# Patient Record
Sex: MALE | Race: WHITE
[De-identification: names, ages, dates, MRNs, and addresses within clinical notes are randomized per-mention and may not be internally consistent; named-entity substitution may affect disease eponyms.]

---

## 2018-10-09 LAB
ALBUMIN SERPL BCP-MCNC: 3.6 G/DL (ref 3.4–5)
ALBUMIN/GLOB SERPL: 1 {RATIO} (ref 1.1–1.5)
ALP SERPL-CCNC: 92 IU/L (ref 46–116)
ALT SERPL W P-5'-P-CCNC: 37 U/L (ref 30–65)
ANION GAP SERPL CALCULATED.3IONS-SCNC: 9 MMOL/L (ref 8–16)
AST SERPL W P-5'-P-CCNC: 27 U/L (ref 10–37)
BASOPHILS # BLD AUTO: 0 X10'3 (ref 0–0.2)
BASOPHILS NFR BLD AUTO: 0.5 % (ref 0–1)
BILIRUB SERPL-MCNC: 0.5 MG/DL (ref 0–1)
BUN SERPL-MCNC: 19 MG/DL (ref 7–18)
BUN/CREAT SERPL: 20.7 (ref 5.4–32)
CALCIUM SERPL-MCNC: 8.6 MG/DL (ref 8.5–10.1)
CHLORIDE SERPL-SCNC: 105 MMOL/L (ref 99–107)
CO2 SERPL-SCNC: 27.2 MMOL/L (ref 24–32)
CREAT SERPL-MCNC: 0.92 MG/DL (ref 0.6–1.1)
EOSINOPHIL # BLD AUTO: 0.1 X10'3 (ref 0–0.9)
EOSINOPHIL NFR BLD AUTO: 1.1 % (ref 0–6)
ERYTHROCYTE [DISTWIDTH] IN BLOOD BY AUTOMATED COUNT: 12.6 % (ref 11.5–14.5)
GFR SERPL CREATININE-BSD FRML MDRD: 82 ML/MIN
GLUCOSE SERPL-MCNC: 118 MG/DL (ref 70–104)
HBA1C MFR BLD: 6.3 % (ref 4.5–6.2)
HCT VFR BLD AUTO: 43.5 % (ref 42–52)
HGB BLD-MCNC: 14.6 G/DL (ref 14–17.9)
LYMPHOCYTES # BLD AUTO: 1.2 X10'3 (ref 1.1–4.8)
LYMPHOCYTES NFR BLD AUTO: 16.9 % (ref 21–51)
MCH RBC QN AUTO: 30.2 PG (ref 27–31)
MCHC RBC AUTO-ENTMCNC: 33.7 % (ref 33–36.5)
MCV RBC AUTO: 89.7 FL (ref 78–98)
MONOCYTES # BLD AUTO: 0.7 X10'3 (ref 0–0.9)
MONOCYTES NFR BLD AUTO: 9.1 % (ref 2–12)
NEUTROPHILS # BLD AUTO: 5.4 X10'3 (ref 1.8–7.7)
NEUTROPHILS NFR BLD AUTO: 72.4 % (ref 42–75)
PLATELET # BLD AUTO: 194 X10'3 (ref 140–440)
PMV BLD AUTO: 7.9 FL (ref 7.4–10.4)
POTASSIUM SERPL-SCNC: 4 MMOL/L (ref 3.4–5.1)
PROT SERPL-MCNC: 7.2 G/DL (ref 6.4–8.2)
RBC # BLD AUTO: 4.85 X10'6 (ref 4.7–6.1)
SODIUM SERPL-SCNC: 141 MMOL/L (ref 135–145)

## 2018-10-17 ENCOUNTER — HOSPITAL ENCOUNTER (INPATIENT)
Dept: HOSPITAL 94 - PAS IN | Age: 68
LOS: 3 days | Discharge: HOME | DRG: 470 | End: 2018-10-20
Attending: ORTHOPAEDIC SURGERY | Admitting: ORTHOPAEDIC SURGERY
Payer: COMMERCIAL

## 2018-10-17 VITALS — SYSTOLIC BLOOD PRESSURE: 121 MMHG | DIASTOLIC BLOOD PRESSURE: 64 MMHG

## 2018-10-17 VITALS — DIASTOLIC BLOOD PRESSURE: 59 MMHG | SYSTOLIC BLOOD PRESSURE: 110 MMHG

## 2018-10-17 VITALS — SYSTOLIC BLOOD PRESSURE: 130 MMHG | DIASTOLIC BLOOD PRESSURE: 87 MMHG

## 2018-10-17 VITALS — DIASTOLIC BLOOD PRESSURE: 64 MMHG | SYSTOLIC BLOOD PRESSURE: 113 MMHG

## 2018-10-17 VITALS — DIASTOLIC BLOOD PRESSURE: 46 MMHG | SYSTOLIC BLOOD PRESSURE: 110 MMHG

## 2018-10-17 VITALS — SYSTOLIC BLOOD PRESSURE: 122 MMHG | DIASTOLIC BLOOD PRESSURE: 70 MMHG

## 2018-10-17 VITALS — SYSTOLIC BLOOD PRESSURE: 97 MMHG | DIASTOLIC BLOOD PRESSURE: 58 MMHG

## 2018-10-17 VITALS — DIASTOLIC BLOOD PRESSURE: 68 MMHG | SYSTOLIC BLOOD PRESSURE: 127 MMHG

## 2018-10-17 VITALS — SYSTOLIC BLOOD PRESSURE: 118 MMHG | DIASTOLIC BLOOD PRESSURE: 77 MMHG

## 2018-10-17 VITALS — DIASTOLIC BLOOD PRESSURE: 43 MMHG | SYSTOLIC BLOOD PRESSURE: 127 MMHG

## 2018-10-17 VITALS — HEIGHT: 70 IN | BODY MASS INDEX: 33.49 KG/M2 | WEIGHT: 233.91 LBS

## 2018-10-17 VITALS — DIASTOLIC BLOOD PRESSURE: 67 MMHG | SYSTOLIC BLOOD PRESSURE: 106 MMHG

## 2018-10-17 VITALS — DIASTOLIC BLOOD PRESSURE: 51 MMHG | SYSTOLIC BLOOD PRESSURE: 113 MMHG

## 2018-10-17 VITALS — SYSTOLIC BLOOD PRESSURE: 126 MMHG | DIASTOLIC BLOOD PRESSURE: 72 MMHG

## 2018-10-17 VITALS — DIASTOLIC BLOOD PRESSURE: 46 MMHG | SYSTOLIC BLOOD PRESSURE: 103 MMHG

## 2018-10-17 VITALS — DIASTOLIC BLOOD PRESSURE: 87 MMHG | SYSTOLIC BLOOD PRESSURE: 130 MMHG

## 2018-10-17 VITALS — SYSTOLIC BLOOD PRESSURE: 112 MMHG | DIASTOLIC BLOOD PRESSURE: 61 MMHG

## 2018-10-17 VITALS — DIASTOLIC BLOOD PRESSURE: 60 MMHG | SYSTOLIC BLOOD PRESSURE: 104 MMHG

## 2018-10-17 VITALS — DIASTOLIC BLOOD PRESSURE: 64 MMHG | SYSTOLIC BLOOD PRESSURE: 115 MMHG

## 2018-10-17 DIAGNOSIS — E11.9: ICD-10-CM

## 2018-10-17 DIAGNOSIS — I10: ICD-10-CM

## 2018-10-17 DIAGNOSIS — K21.9: ICD-10-CM

## 2018-10-17 DIAGNOSIS — N40.0: ICD-10-CM

## 2018-10-17 DIAGNOSIS — D62: ICD-10-CM

## 2018-10-17 DIAGNOSIS — G47.30: ICD-10-CM

## 2018-10-17 DIAGNOSIS — E78.5: ICD-10-CM

## 2018-10-17 DIAGNOSIS — I25.2: ICD-10-CM

## 2018-10-17 DIAGNOSIS — M17.11: Primary | ICD-10-CM

## 2018-10-17 DIAGNOSIS — Z88.8: ICD-10-CM

## 2018-10-17 LAB
ANION GAP BLD CALC-SCNC: 14 MMOL/L (ref 8–12)
BUN BLD-MCNC: 13 MG/DL (ref 6–19)
BUN/CREAT BLD: 18.6 (ref 5.4–32)
CA-I BLD-SCNC: 1.13 MMOL/L (ref 1.03–1.32)
CHLORIDE BLD-SCNC: 100 MMOL/L (ref 99–107)
CO2 BLD-SCNC: 25 MMOL/L (ref 24–32)
CREAT BLD-MCNC: 0.7 MG/DL (ref 0.8–1.3)
GFR SERPL CREATININE-BSD FRML MDRD: > 90 ML/MIN
GLUCOSE SERPLBLD-MCNC: 128 MG/DL (ref 70–104)
HCT VFR BLD CALC: 35 %PCV (ref 42–52)
HGB BLD CALC-MCNC: 11.9 G/DL (ref 14–18)
POTASSIUM BLD-SCNC: 3.7 MMOL/L (ref 3.5–5.1)
SODIUM BLD-SCNC: 139 MMOL/L (ref 135–145)

## 2018-10-17 PROCEDURE — 82948 REAGENT STRIP/BLOOD GLUCOSE: CPT

## 2018-10-17 PROCEDURE — 83036 HEMOGLOBIN GLYCOSYLATED A1C: CPT

## 2018-10-17 PROCEDURE — 85025 COMPLETE CBC W/AUTO DIFF WBC: CPT

## 2018-10-17 PROCEDURE — 0SRC069 REPLACEMENT OF RIGHT KNEE JOINT WITH OXIDIZED ZIRCONIUM ON POLYETHYLENE SYNTHETIC SUBSTITUTE, CEMENTED, OPEN APPROACH: ICD-10-PCS | Performed by: ORTHOPAEDIC SURGERY

## 2018-10-17 PROCEDURE — 8E0Y0CZ ROBOTIC ASSISTED PROCEDURE OF LOWER EXTREMITY, OPEN APPROACH: ICD-10-PCS | Performed by: ORTHOPAEDIC SURGERY

## 2018-10-17 PROCEDURE — 97161 PT EVAL LOW COMPLEX 20 MIN: CPT

## 2018-10-17 PROCEDURE — 80047 BASIC METABLC PNL IONIZED CA: CPT

## 2018-10-17 PROCEDURE — 87070 CULTURE OTHR SPECIMN AEROBIC: CPT

## 2018-10-17 PROCEDURE — 36415 COLL VENOUS BLD VENIPUNCTURE: CPT

## 2018-10-17 PROCEDURE — 97530 THERAPEUTIC ACTIVITIES: CPT

## 2018-10-17 PROCEDURE — 80051 ELECTROLYTE PANEL: CPT

## 2018-10-17 PROCEDURE — 97116 GAIT TRAINING THERAPY: CPT

## 2018-10-17 PROCEDURE — 80053 COMPREHEN METABOLIC PANEL: CPT

## 2018-10-17 PROCEDURE — 97110 THERAPEUTIC EXERCISES: CPT

## 2018-10-17 RX ADMIN — Medication SCH EACH: at 21:45

## 2018-10-18 VITALS — DIASTOLIC BLOOD PRESSURE: 46 MMHG | SYSTOLIC BLOOD PRESSURE: 137 MMHG

## 2018-10-18 VITALS — SYSTOLIC BLOOD PRESSURE: 115 MMHG | DIASTOLIC BLOOD PRESSURE: 57 MMHG

## 2018-10-18 VITALS — SYSTOLIC BLOOD PRESSURE: 120 MMHG | DIASTOLIC BLOOD PRESSURE: 58 MMHG

## 2018-10-18 VITALS — SYSTOLIC BLOOD PRESSURE: 111 MMHG | DIASTOLIC BLOOD PRESSURE: 69 MMHG

## 2018-10-18 VITALS — SYSTOLIC BLOOD PRESSURE: 93 MMHG | DIASTOLIC BLOOD PRESSURE: 58 MMHG

## 2018-10-18 VITALS — SYSTOLIC BLOOD PRESSURE: 105 MMHG | DIASTOLIC BLOOD PRESSURE: 52 MMHG

## 2018-10-18 LAB
ANION GAP SERPL CALCULATED.3IONS-SCNC: 7 MMOL/L (ref 8–16)
BASOPHILS # BLD AUTO: 0 X10'3 (ref 0–0.2)
BASOPHILS NFR BLD AUTO: 0.3 % (ref 0–1)
CHLORIDE SERPL-SCNC: 103 MMOL/L (ref 99–107)
CO2 SERPL-SCNC: 27.8 MMOL/L (ref 24–32)
EOSINOPHIL # BLD AUTO: 0.1 X10'3 (ref 0–0.9)
EOSINOPHIL NFR BLD AUTO: 1.6 % (ref 0–6)
ERYTHROCYTE [DISTWIDTH] IN BLOOD BY AUTOMATED COUNT: 13.5 % (ref 11.5–14.5)
HCT VFR BLD AUTO: 32.3 % (ref 42–52)
HGB BLD-MCNC: 10.8 G/DL (ref 14–17.9)
LYMPHOCYTES # BLD AUTO: 1.2 X10'3 (ref 1.1–4.8)
LYMPHOCYTES NFR BLD AUTO: 19.7 % (ref 21–51)
MCH RBC QN AUTO: 30.1 PG (ref 27–31)
MCHC RBC AUTO-ENTMCNC: 33.5 % (ref 33–36.5)
MCV RBC AUTO: 90.1 FL (ref 78–98)
MONOCYTES # BLD AUTO: 0.9 X10'3 (ref 0–0.9)
MONOCYTES NFR BLD AUTO: 14.9 % (ref 2–12)
NEUTROPHILS # BLD AUTO: 3.9 X10'3 (ref 1.8–7.7)
NEUTROPHILS NFR BLD AUTO: 63.5 % (ref 42–75)
PLATELET # BLD AUTO: 156 X10'3 (ref 140–440)
PMV BLD AUTO: 7.7 FL (ref 7.4–10.4)
POTASSIUM SERPL-SCNC: 3.6 MMOL/L (ref 3.5–5.1)
RBC # BLD AUTO: 3.59 X10'6 (ref 4.7–6.1)
SODIUM SERPL-SCNC: 138 MMOL/L (ref 135–145)
WBC # BLD AUTO: 6.2 X10'3 (ref 4.5–11)

## 2018-10-18 PROCEDURE — 5A09357 ASSISTANCE WITH RESPIRATORY VENTILATION, LESS THAN 24 CONSECUTIVE HOURS, CONTINUOUS POSITIVE AIRWAY PRESSURE: ICD-10-PCS | Performed by: ORTHOPAEDIC SURGERY

## 2018-10-18 RX ADMIN — Medication SCH MLS/HR: at 07:45

## 2018-10-18 RX ADMIN — OXYCODONE PRN MG: 5 TABLET ORAL at 07:45

## 2018-10-18 RX ADMIN — SODIUM CHLORIDE AND POTASSIUM CHLORIDE SCH MLS/HR: 4.5; 1.49 INJECTION, SOLUTION INTRAVENOUS at 17:08

## 2018-10-18 RX ADMIN — OXYCODONE PRN MG: 5 TABLET ORAL at 13:56

## 2018-10-18 RX ADMIN — OXYCODONE PRN MG: 5 TABLET ORAL at 17:44

## 2018-10-18 RX ADMIN — SODIUM CHLORIDE AND POTASSIUM CHLORIDE SCH MLS/HR: 4.5; 1.49 INJECTION, SOLUTION INTRAVENOUS at 11:39

## 2018-10-18 RX ADMIN — Medication SCH UNIT: at 07:45

## 2018-10-18 RX ADMIN — Medication SCH EACH: at 19:49

## 2018-10-18 RX ADMIN — OXYCODONE PRN MG: 5 TABLET ORAL at 21:19

## 2018-10-18 RX ADMIN — Medication SCH EACH: at 10:13

## 2018-10-18 RX ADMIN — SODIUM CHLORIDE AND POTASSIUM CHLORIDE SCH MLS/HR: 4.5; 1.49 INJECTION, SOLUTION INTRAVENOUS at 01:08

## 2018-10-18 RX ADMIN — METOPROLOL SUCCINATE SCH MG: 25 TABLET, EXTENDED RELEASE ORAL at 21:18

## 2018-10-18 RX ADMIN — Medication NR EACH: at 10:00

## 2018-10-18 RX ADMIN — SODIUM CHLORIDE AND POTASSIUM CHLORIDE SCH MLS/HR: 4.5; 1.49 INJECTION, SOLUTION INTRAVENOUS at 00:08

## 2018-10-18 RX ADMIN — ENOXAPARIN SODIUM SCH MG: 100 INJECTION SUBCUTANEOUS at 07:45

## 2018-10-18 RX ADMIN — Medication SCH MLS/HR: at 00:06

## 2018-10-18 RX ADMIN — METOPROLOL SUCCINATE SCH MG: 25 TABLET, EXTENDED RELEASE ORAL at 00:19

## 2018-10-18 RX ADMIN — PANTOPRAZOLE SODIUM SCH MG: 40 TABLET, DELAYED RELEASE ORAL at 07:45

## 2018-10-19 VITALS — SYSTOLIC BLOOD PRESSURE: 114 MMHG | DIASTOLIC BLOOD PRESSURE: 57 MMHG

## 2018-10-19 VITALS — DIASTOLIC BLOOD PRESSURE: 54 MMHG | SYSTOLIC BLOOD PRESSURE: 114 MMHG

## 2018-10-19 VITALS — DIASTOLIC BLOOD PRESSURE: 54 MMHG | SYSTOLIC BLOOD PRESSURE: 121 MMHG

## 2018-10-19 VITALS — DIASTOLIC BLOOD PRESSURE: 60 MMHG | SYSTOLIC BLOOD PRESSURE: 121 MMHG

## 2018-10-19 LAB
BASOPHILS # BLD AUTO: 0.1 X10'3 (ref 0–0.2)
BASOPHILS NFR BLD AUTO: 0.9 % (ref 0–1)
EOSINOPHIL # BLD AUTO: 0.1 X10'3 (ref 0–0.9)
EOSINOPHIL NFR BLD AUTO: 1.2 % (ref 0–6)
ERYTHROCYTE [DISTWIDTH] IN BLOOD BY AUTOMATED COUNT: 13.5 % (ref 11.5–14.5)
HCT VFR BLD AUTO: 34.5 % (ref 42–52)
HGB BLD-MCNC: 11.5 G/DL (ref 14–17.9)
LYMPHOCYTES # BLD AUTO: 1.3 X10'3 (ref 1.1–4.8)
LYMPHOCYTES NFR BLD AUTO: 15.1 % (ref 21–51)
MCH RBC QN AUTO: 30.2 PG (ref 27–31)
MCHC RBC AUTO-ENTMCNC: 33.2 % (ref 33–36.5)
MCV RBC AUTO: 90.9 FL (ref 78–98)
MONOCYTES # BLD AUTO: 1.1 X10'3 (ref 0–0.9)
MONOCYTES NFR BLD AUTO: 13.1 % (ref 2–12)
NEUTROPHILS # BLD AUTO: 5.8 X10'3 (ref 1.8–7.7)
NEUTROPHILS NFR BLD AUTO: 69.7 % (ref 42–75)
PLATELET # BLD AUTO: 181 X10'3 (ref 140–440)
PMV BLD AUTO: 7.7 FL (ref 7.4–10.4)
RBC # BLD AUTO: 3.8 X10'6 (ref 4.7–6.1)
WBC # BLD AUTO: 8.3 X10'3 (ref 4.5–11)

## 2018-10-19 PROCEDURE — 5A09357 ASSISTANCE WITH RESPIRATORY VENTILATION, LESS THAN 24 CONSECUTIVE HOURS, CONTINUOUS POSITIVE AIRWAY PRESSURE: ICD-10-PCS | Performed by: ORTHOPAEDIC SURGERY

## 2018-10-19 RX ADMIN — OXYCODONE PRN MG: 5 TABLET ORAL at 05:19

## 2018-10-19 RX ADMIN — Medication SCH UNIT: at 07:45

## 2018-10-19 RX ADMIN — Medication SCH EACH: at 20:23

## 2018-10-19 RX ADMIN — OXYCODONE PRN MG: 5 TABLET ORAL at 09:50

## 2018-10-19 RX ADMIN — Medication SCH EACH: at 07:43

## 2018-10-19 RX ADMIN — PANTOPRAZOLE SODIUM SCH MG: 40 TABLET, DELAYED RELEASE ORAL at 07:43

## 2018-10-19 RX ADMIN — ENOXAPARIN SODIUM SCH MG: 100 INJECTION SUBCUTANEOUS at 07:45

## 2018-10-19 RX ADMIN — Medication NR EACH: at 10:00

## 2018-10-19 RX ADMIN — METOPROLOL SUCCINATE SCH MG: 25 TABLET, EXTENDED RELEASE ORAL at 20:23

## 2018-10-19 RX ADMIN — OXYCODONE PRN MG: 5 TABLET ORAL at 14:15

## 2018-10-20 VITALS — DIASTOLIC BLOOD PRESSURE: 55 MMHG | SYSTOLIC BLOOD PRESSURE: 109 MMHG

## 2018-10-20 VITALS — SYSTOLIC BLOOD PRESSURE: 118 MMHG | DIASTOLIC BLOOD PRESSURE: 50 MMHG

## 2018-10-20 LAB
BASOPHILS # BLD AUTO: 0 X10'3 (ref 0–0.2)
BASOPHILS NFR BLD AUTO: 0.2 % (ref 0–1)
EOSINOPHIL # BLD AUTO: 0.2 X10'3 (ref 0–0.9)
EOSINOPHIL NFR BLD AUTO: 2.3 % (ref 0–6)
ERYTHROCYTE [DISTWIDTH] IN BLOOD BY AUTOMATED COUNT: 13.8 % (ref 11.5–14.5)
HCT VFR BLD AUTO: 32.8 % (ref 42–52)
HGB BLD-MCNC: 11 G/DL (ref 14–17.9)
LYMPHOCYTES # BLD AUTO: 1.5 X10'3 (ref 1.1–4.8)
LYMPHOCYTES NFR BLD AUTO: 19.2 % (ref 21–51)
MCH RBC QN AUTO: 30.1 PG (ref 27–31)
MCHC RBC AUTO-ENTMCNC: 33.5 % (ref 33–36.5)
MCV RBC AUTO: 89.9 FL (ref 78–98)
MONOCYTES # BLD AUTO: 1.1 X10'3 (ref 0–0.9)
MONOCYTES NFR BLD AUTO: 14.4 % (ref 2–12)
NEUTROPHILS # BLD AUTO: 4.9 X10'3 (ref 1.8–7.7)
NEUTROPHILS NFR BLD AUTO: 63.9 % (ref 42–75)
PLATELET # BLD AUTO: 183 X10'3 (ref 140–440)
PMV BLD AUTO: 8 FL (ref 7.4–10.4)
RBC # BLD AUTO: 3.65 X10'6 (ref 4.7–6.1)
WBC # BLD AUTO: 7.6 X10'3 (ref 4.5–11)

## 2018-10-20 RX ADMIN — Medication SCH EACH: at 08:20

## 2018-10-20 RX ADMIN — OXYCODONE PRN MG: 5 TABLET ORAL at 09:52

## 2018-10-20 RX ADMIN — PANTOPRAZOLE SODIUM SCH MG: 40 TABLET, DELAYED RELEASE ORAL at 08:20

## 2018-10-20 RX ADMIN — Medication NR EACH: at 10:32

## 2018-10-20 RX ADMIN — OXYCODONE PRN MG: 5 TABLET ORAL at 05:24

## 2018-10-20 RX ADMIN — ENOXAPARIN SODIUM SCH MG: 100 INJECTION SUBCUTANEOUS at 08:20

## 2018-10-20 RX ADMIN — Medication SCH UNIT: at 08:21

## 2019-08-13 LAB
ALBUMIN SERPL BCP-MCNC: 3.9 G/DL (ref 3.4–5)
ALBUMIN/GLOB SERPL: 1 {RATIO} (ref 1.1–1.5)
ALP SERPL-CCNC: 86 IU/L (ref 46–116)
ALT SERPL W P-5'-P-CCNC: 33 U/L (ref 30–65)
ANION GAP SERPL CALCULATED.3IONS-SCNC: 7 MMOL/L (ref 8–16)
AST SERPL W P-5'-P-CCNC: 26 U/L (ref 10–37)
BASOPHILS # BLD AUTO: 0 X10'3 (ref 0–0.2)
BASOPHILS NFR BLD AUTO: 0.5 % (ref 0–1)
BILIRUB SERPL-MCNC: 0.7 MG/DL (ref 0–1)
BUN SERPL-MCNC: 23 MG/DL (ref 7–18)
BUN/CREAT SERPL: 22.3 (ref 5.4–32)
CALCIUM SERPL-MCNC: 9.1 MG/DL (ref 8.5–10.1)
CHLORIDE SERPL-SCNC: 105 MMOL/L (ref 99–107)
CO2 SERPL-SCNC: 27.8 MMOL/L (ref 24–32)
CREAT SERPL-MCNC: 1.03 MG/DL (ref 0.6–1.1)
EOSINOPHIL # BLD AUTO: 0.1 X10'3 (ref 0–0.9)
EOSINOPHIL NFR BLD AUTO: 1.5 % (ref 0–6)
ERYTHROCYTE [DISTWIDTH] IN BLOOD BY AUTOMATED COUNT: 14.4 % (ref 11.5–14.5)
GFR SERPL CREATININE-BSD FRML MDRD: 72 ML/MIN
GLUCOSE SERPL-MCNC: 102 MG/DL (ref 70–104)
HBA1C MFR BLD: 6 % (ref 4.5–6.2)
HCT VFR BLD AUTO: 44.5 % (ref 42–52)
HGB BLD-MCNC: 15.1 G/DL (ref 14–17.9)
LYMPHOCYTES # BLD AUTO: 2.2 X10'3 (ref 1.1–4.8)
LYMPHOCYTES NFR BLD AUTO: 29.8 % (ref 21–51)
MCH RBC QN AUTO: 30.2 PG (ref 27–31)
MCHC RBC AUTO-ENTMCNC: 33.9 G/DL (ref 33–36.5)
MCV RBC AUTO: 89.2 FL (ref 78–98)
MONOCYTES # BLD AUTO: 1 X10'3 (ref 0–0.9)
MONOCYTES NFR BLD AUTO: 13.3 % (ref 2–12)
NEUTROPHILS # BLD AUTO: 4 X10'3 (ref 1.8–7.7)
NEUTROPHILS NFR BLD AUTO: 54.9 % (ref 42–75)
PLATELET # BLD AUTO: 204 X10'3 (ref 140–440)
PMV BLD AUTO: 7.3 FL (ref 7.4–10.4)
POTASSIUM SERPL-SCNC: 4 MMOL/L (ref 3.4–5.1)
PROT SERPL-MCNC: 7.8 G/DL (ref 6.4–8.2)
RBC # BLD AUTO: 4.98 X10'6 (ref 4.7–6.1)
SODIUM SERPL-SCNC: 140 MMOL/L (ref 135–145)

## 2019-08-21 ENCOUNTER — HOSPITAL ENCOUNTER (INPATIENT)
Dept: HOSPITAL 94 - PAS IN | Age: 69
LOS: 1 days | Discharge: HOME | DRG: 470 | End: 2019-08-22
Attending: ORTHOPAEDIC SURGERY | Admitting: ORTHOPAEDIC SURGERY
Payer: MEDICARE

## 2019-08-21 VITALS — SYSTOLIC BLOOD PRESSURE: 114 MMHG | DIASTOLIC BLOOD PRESSURE: 57 MMHG

## 2019-08-21 VITALS — DIASTOLIC BLOOD PRESSURE: 58 MMHG | SYSTOLIC BLOOD PRESSURE: 125 MMHG

## 2019-08-21 VITALS — DIASTOLIC BLOOD PRESSURE: 56 MMHG | SYSTOLIC BLOOD PRESSURE: 114 MMHG

## 2019-08-21 VITALS — DIASTOLIC BLOOD PRESSURE: 68 MMHG | SYSTOLIC BLOOD PRESSURE: 115 MMHG

## 2019-08-21 VITALS — DIASTOLIC BLOOD PRESSURE: 58 MMHG | SYSTOLIC BLOOD PRESSURE: 108 MMHG

## 2019-08-21 VITALS — DIASTOLIC BLOOD PRESSURE: 52 MMHG | SYSTOLIC BLOOD PRESSURE: 101 MMHG

## 2019-08-21 VITALS — SYSTOLIC BLOOD PRESSURE: 113 MMHG | DIASTOLIC BLOOD PRESSURE: 53 MMHG

## 2019-08-21 VITALS — DIASTOLIC BLOOD PRESSURE: 52 MMHG | SYSTOLIC BLOOD PRESSURE: 110 MMHG

## 2019-08-21 VITALS — BODY MASS INDEX: 30.39 KG/M2 | HEIGHT: 73 IN | WEIGHT: 229.28 LBS

## 2019-08-21 VITALS — SYSTOLIC BLOOD PRESSURE: 114 MMHG | DIASTOLIC BLOOD PRESSURE: 52 MMHG

## 2019-08-21 VITALS — DIASTOLIC BLOOD PRESSURE: 58 MMHG | SYSTOLIC BLOOD PRESSURE: 105 MMHG

## 2019-08-21 VITALS — SYSTOLIC BLOOD PRESSURE: 129 MMHG | DIASTOLIC BLOOD PRESSURE: 68 MMHG

## 2019-08-21 VITALS — SYSTOLIC BLOOD PRESSURE: 107 MMHG | DIASTOLIC BLOOD PRESSURE: 61 MMHG

## 2019-08-21 VITALS — DIASTOLIC BLOOD PRESSURE: 62 MMHG | SYSTOLIC BLOOD PRESSURE: 115 MMHG

## 2019-08-21 VITALS — SYSTOLIC BLOOD PRESSURE: 107 MMHG | DIASTOLIC BLOOD PRESSURE: 52 MMHG

## 2019-08-21 VITALS — DIASTOLIC BLOOD PRESSURE: 55 MMHG | SYSTOLIC BLOOD PRESSURE: 105 MMHG

## 2019-08-21 VITALS — SYSTOLIC BLOOD PRESSURE: 105 MMHG | DIASTOLIC BLOOD PRESSURE: 60 MMHG

## 2019-08-21 VITALS — SYSTOLIC BLOOD PRESSURE: 130 MMHG | DIASTOLIC BLOOD PRESSURE: 71 MMHG

## 2019-08-21 VITALS — DIASTOLIC BLOOD PRESSURE: 67 MMHG | SYSTOLIC BLOOD PRESSURE: 106 MMHG

## 2019-08-21 DIAGNOSIS — Z79.84: ICD-10-CM

## 2019-08-21 DIAGNOSIS — E78.5: ICD-10-CM

## 2019-08-21 DIAGNOSIS — D62: ICD-10-CM

## 2019-08-21 DIAGNOSIS — E11.42: ICD-10-CM

## 2019-08-21 DIAGNOSIS — I25.10: ICD-10-CM

## 2019-08-21 DIAGNOSIS — Z79.899: ICD-10-CM

## 2019-08-21 DIAGNOSIS — M17.12: Primary | ICD-10-CM

## 2019-08-21 DIAGNOSIS — Z96.651: ICD-10-CM

## 2019-08-21 DIAGNOSIS — Z98.61: ICD-10-CM

## 2019-08-21 DIAGNOSIS — Z79.82: ICD-10-CM

## 2019-08-21 DIAGNOSIS — M06.9: ICD-10-CM

## 2019-08-21 DIAGNOSIS — G47.30: ICD-10-CM

## 2019-08-21 DIAGNOSIS — I10: ICD-10-CM

## 2019-08-21 DIAGNOSIS — N40.0: ICD-10-CM

## 2019-08-21 DIAGNOSIS — K21.9: ICD-10-CM

## 2019-08-21 DIAGNOSIS — Z88.8: ICD-10-CM

## 2019-08-21 PROCEDURE — 8E0YXCZ ROBOTIC ASSISTED PROCEDURE OF LOWER EXTREMITY: ICD-10-PCS | Performed by: ORTHOPAEDIC SURGERY

## 2019-08-21 PROCEDURE — 36415 COLL VENOUS BLD VENIPUNCTURE: CPT

## 2019-08-21 PROCEDURE — 97116 GAIT TRAINING THERAPY: CPT

## 2019-08-21 PROCEDURE — 87081 CULTURE SCREEN ONLY: CPT

## 2019-08-21 PROCEDURE — 82948 REAGENT STRIP/BLOOD GLUCOSE: CPT

## 2019-08-21 PROCEDURE — 5A09357 ASSISTANCE WITH RESPIRATORY VENTILATION, LESS THAN 24 CONSECUTIVE HOURS, CONTINUOUS POSITIVE AIRWAY PRESSURE: ICD-10-PCS | Performed by: ORTHOPAEDIC SURGERY

## 2019-08-21 PROCEDURE — 97530 THERAPEUTIC ACTIVITIES: CPT

## 2019-08-21 PROCEDURE — 97162 PT EVAL MOD COMPLEX 30 MIN: CPT

## 2019-08-21 PROCEDURE — 85025 COMPLETE CBC W/AUTO DIFF WBC: CPT

## 2019-08-21 PROCEDURE — 93005 ELECTROCARDIOGRAM TRACING: CPT

## 2019-08-21 PROCEDURE — 80051 ELECTROLYTE PANEL: CPT

## 2019-08-21 PROCEDURE — 80053 COMPREHEN METABOLIC PANEL: CPT

## 2019-08-21 PROCEDURE — 97110 THERAPEUTIC EXERCISES: CPT

## 2019-08-21 PROCEDURE — 3E0T3BZ INTRODUCTION OF ANESTHETIC AGENT INTO PERIPHERAL NERVES AND PLEXI, PERCUTANEOUS APPROACH: ICD-10-PCS | Performed by: ANESTHESIOLOGY

## 2019-08-21 PROCEDURE — 83036 HEMOGLOBIN GLYCOSYLATED A1C: CPT

## 2019-08-21 PROCEDURE — 0SRD069 REPLACEMENT OF LEFT KNEE JOINT WITH OXIDIZED ZIRCONIUM ON POLYETHYLENE SYNTHETIC SUBSTITUTE, CEMENTED, OPEN APPROACH: ICD-10-PCS | Performed by: ORTHOPAEDIC SURGERY

## 2019-08-21 RX ADMIN — Medication SCH MLS/HR: at 23:50

## 2019-08-21 RX ADMIN — SODIUM CHLORIDE AND POTASSIUM CHLORIDE SCH MLS/HR: 4.5; 1.49 INJECTION, SOLUTION INTRAVENOUS at 17:33

## 2019-08-21 RX ADMIN — OMEGA-3-ACID ETHYL ESTERS SCH GM: 1 CAPSULE, LIQUID FILLED ORAL at 20:25

## 2019-08-21 RX ADMIN — Medication SCH MLS/HR: at 15:41

## 2019-08-21 RX ADMIN — ROPIVACAINE HYDROCHLORIDE SCH MLS/HR: 2 INJECTION, SOLUTION EPIDURAL; INFILTRATION; PERINEURAL at 10:36

## 2019-08-21 RX ADMIN — SODIUM CHLORIDE AND POTASSIUM CHLORIDE SCH MLS/HR: 4.5; 1.49 INJECTION, SOLUTION INTRAVENOUS at 15:39

## 2019-08-21 NOTE — NUR
Report called to receiving nurse. Transferred via BED, 1 BAG OF PERSONAL Belongings, CPAP 
SENT W/PT TO ROOM 4024B, NURSES AIDE AT BEDSIDE TO RECEIVE PT, WIFE PRESENT, CALL LIGHT 
GIVEN, BLL, SIDE RAILS UP X 2. 

Special Issues communicated to receiving nurse. YES.

-------------------------------------------------------------------------------

Addendum: 08/21/19 at 1141 by Tiffani Ruby RN

-------------------------------------------------------------------------------

Amended: Links added.

## 2019-08-21 NOTE — NUR
Received from OR via BED, accompanied by Anesthesiologist DR GARDNER and report given by 
Anesthesiologist. PT DROWSY, DENIES PAIN, LEFT LEG W/DRSG, LEG WRAP, CDI, ICE PACK AND DANAE 
DRAIN W/GREEN LIGHT ILLUMINATION.

-------------------------------------------------------------------------------

Addendum: 08/21/19 at 1024 by Tiffani Ruby RN

-------------------------------------------------------------------------------

Amended: Links added.

## 2019-08-21 NOTE — NUR
Patient in room ORTHO 4024. I have received report from JAZMIN Gann    and had the opportunity 
to ask questions and assume patient care.

## 2019-08-22 VITALS — SYSTOLIC BLOOD PRESSURE: 107 MMHG | DIASTOLIC BLOOD PRESSURE: 53 MMHG

## 2019-08-22 VITALS — DIASTOLIC BLOOD PRESSURE: 68 MMHG | SYSTOLIC BLOOD PRESSURE: 119 MMHG

## 2019-08-22 VITALS — DIASTOLIC BLOOD PRESSURE: 52 MMHG | SYSTOLIC BLOOD PRESSURE: 103 MMHG

## 2019-08-22 LAB
ANION GAP SERPL CALCULATED.3IONS-SCNC: 6 MMOL/L (ref 8–16)
BASOPHILS # BLD AUTO: 0 X10'3 (ref 0–0.2)
BASOPHILS NFR BLD AUTO: 0.3 % (ref 0–1)
CHLORIDE SERPL-SCNC: 106 MMOL/L (ref 99–107)
CO2 SERPL-SCNC: 27.8 MMOL/L (ref 24–32)
EOSINOPHIL # BLD AUTO: 0.2 X10'3 (ref 0–0.9)
EOSINOPHIL NFR BLD AUTO: 2.6 % (ref 0–6)
EOSINOPHIL NFR BLD MANUAL: 3 % (ref 0–6)
ERYTHROCYTE [DISTWIDTH] IN BLOOD BY AUTOMATED COUNT: 14.2 % (ref 11.5–14.5)
HCT VFR BLD AUTO: 35.6 % (ref 42–52)
HGB BLD-MCNC: 12.1 G/DL (ref 14–17.9)
LYMPHOCYTES # BLD AUTO: 1.6 X10'3 (ref 1.1–4.8)
LYMPHOCYTES NFR BLD AUTO: 24.2 % (ref 21–51)
LYMPHOCYTES NFR BLD MANUAL: 24 % (ref 21–51)
MCH RBC QN AUTO: 30.3 PG (ref 27–31)
MCHC RBC AUTO-ENTMCNC: 33.9 G/DL (ref 33–36.5)
MCV RBC AUTO: 89.4 FL (ref 78–98)
MONOCYTES # BLD AUTO: 1.1 X10'3 (ref 0–0.9)
MONOCYTES NFR BLD AUTO: 16 % (ref 2–12)
MONOCYTES NFR BLD MANUAL: 9 % (ref 2–12)
NEUTROPHILS # BLD AUTO: 3.8 X10'3 (ref 1.8–7.7)
NEUTROPHILS NFR BLD AUTO: 56.9 % (ref 42–75)
NEUTS SEG NFR BLD MANUAL: 64 % (ref 42–75)
PLATELET # BLD AUTO: 156 X10'3 (ref 140–440)
PLATELET BLD QL SMEAR: NORMAL
PMV BLD AUTO: 7.1 FL (ref 7.4–10.4)
POTASSIUM SERPL-SCNC: 3.5 MMOL/L (ref 3.5–5.1)
RBC # BLD AUTO: 3.98 X10'6 (ref 4.7–6.1)
RBC MORPH BLD: NORMAL
SODIUM SERPL-SCNC: 140 MMOL/L (ref 135–145)
TOTAL CELLS COUNTED FLD: 100
WBC # BLD AUTO: 6.6 X10'3 (ref 4.5–11)

## 2019-08-22 RX ADMIN — OMEGA-3-ACID ETHYL ESTERS SCH GM: 1 CAPSULE, LIQUID FILLED ORAL at 09:13

## 2019-08-22 RX ADMIN — SODIUM CHLORIDE AND POTASSIUM CHLORIDE SCH MLS/HR: 4.5; 1.49 INJECTION, SOLUTION INTRAVENOUS at 09:33

## 2019-08-22 RX ADMIN — SODIUM CHLORIDE AND POTASSIUM CHLORIDE SCH MLS/HR: 4.5; 1.49 INJECTION, SOLUTION INTRAVENOUS at 02:14

## 2019-08-22 RX ADMIN — ROPIVACAINE HYDROCHLORIDE SCH MLS/HR: 2 INJECTION, SOLUTION EPIDURAL; INFILTRATION; PERINEURAL at 02:16

## 2019-08-22 NOTE — NUR
Joint/DM consults: A1C <7 and not appropriate for DM ed at this time. Pt s/p L knee surgery 
% regular dinner last night and decreased to 25% breakfast today noted to have nausea. 
Ensure pudding BIDBD added to meals for additional protein needs. Labs and GLU WNL on 
regular diet. Will continue to monitor for additional protein needs post-op.

-------------------------------------------------------------------------------

Addendum: 08/22/19 at 1142 by Mert Morales RD

-------------------------------------------------------------------------------

Amended: Links added.

## 2019-08-22 NOTE — NUR
anti emetic was given this a.m due to nausea, late tray was ordered, will reassess intake 
when finished

-------------------------------------------------------------------------------

Addendum: 08/22/19 at 1118 by Dana Foster RN

-------------------------------------------------------------------------------

Amended: Links added.

## 2019-08-22 NOTE — NUR
Problems reprioritized. Patient report given, questions answered & plan of care reviewed 
with JAZMIN Sofia.

## 2019-08-22 NOTE — NUR
Pt takes gabapentin 100 mg tid at [facility]. Will confirm the extra 300 mg tid dose aurelia Boucher

## 2021-09-03 ENCOUNTER — HOSPITAL ENCOUNTER (INPATIENT)
Dept: HOSPITAL 94 - ER | Age: 71
LOS: 7 days | Discharge: HOME | DRG: 177 | End: 2021-09-10
Attending: INTERNAL MEDICINE | Admitting: INTERNAL MEDICINE
Payer: MEDICARE

## 2021-09-03 VITALS — DIASTOLIC BLOOD PRESSURE: 83 MMHG | SYSTOLIC BLOOD PRESSURE: 124 MMHG

## 2021-09-03 VITALS — HEIGHT: 72 IN | WEIGHT: 220.46 LBS | BODY MASS INDEX: 29.86 KG/M2

## 2021-09-03 DIAGNOSIS — G89.29: ICD-10-CM

## 2021-09-03 DIAGNOSIS — D84.9: ICD-10-CM

## 2021-09-03 DIAGNOSIS — M06.9: ICD-10-CM

## 2021-09-03 DIAGNOSIS — Z79.82: ICD-10-CM

## 2021-09-03 DIAGNOSIS — N40.0: ICD-10-CM

## 2021-09-03 DIAGNOSIS — D64.9: ICD-10-CM

## 2021-09-03 DIAGNOSIS — E78.00: ICD-10-CM

## 2021-09-03 DIAGNOSIS — E66.9: ICD-10-CM

## 2021-09-03 DIAGNOSIS — Z79.899: ICD-10-CM

## 2021-09-03 DIAGNOSIS — E78.5: ICD-10-CM

## 2021-09-03 DIAGNOSIS — Z96.653: ICD-10-CM

## 2021-09-03 DIAGNOSIS — Z88.8: ICD-10-CM

## 2021-09-03 DIAGNOSIS — J12.82: ICD-10-CM

## 2021-09-03 DIAGNOSIS — J96.01: ICD-10-CM

## 2021-09-03 DIAGNOSIS — E11.9: ICD-10-CM

## 2021-09-03 DIAGNOSIS — U07.1: Primary | ICD-10-CM

## 2021-09-03 DIAGNOSIS — G47.33: ICD-10-CM

## 2021-09-03 DIAGNOSIS — I10: ICD-10-CM

## 2021-09-03 DIAGNOSIS — K52.9: ICD-10-CM

## 2021-09-03 DIAGNOSIS — K21.9: ICD-10-CM

## 2021-09-03 LAB
ALBUMIN SERPL BCP-MCNC: 3 G/DL (ref 3.4–5)
ALBUMIN/GLOB SERPL: 0.6 {RATIO} (ref 1.1–1.5)
ALP SERPL-CCNC: 108 IU/L (ref 46–116)
ALT SERPL W P-5'-P-CCNC: 64 U/L (ref 12–78)
ANION GAP SERPL CALCULATED.3IONS-SCNC: 11 MMOL/L (ref 8–16)
AST SERPL W P-5'-P-CCNC: 90 U/L (ref 10–37)
BASOPHILS # BLD AUTO: 0 X10'3 (ref 0–0.2)
BASOPHILS NFR BLD AUTO: 0.3 % (ref 0–1)
BILIRUB SERPL-MCNC: 0.7 MG/DL (ref 0.1–1)
BUN SERPL-MCNC: 26 MG/DL (ref 7–18)
BUN/CREAT SERPL: 17.1 (ref 5.4–32)
CALCIUM SERPL-MCNC: 8.2 MG/DL (ref 8.5–10.1)
CHLORIDE SERPL-SCNC: 101 MMOL/L (ref 99–107)
CO2 SERPL-SCNC: 24.3 MMOL/L (ref 24–32)
CREAT SERPL-MCNC: 1.52 MG/DL (ref 0.6–1.1)
CRP SERPL HS-MCNC: 10.32 MG/DL (ref 0–0.5)
D DIMER PPP FEU-MCNC: 1.24 MG/L FEU (ref 0–0.5)
EOSINOPHIL # BLD AUTO: 0 X10'3 (ref 0–0.9)
EOSINOPHIL NFR BLD AUTO: 0.1 % (ref 0–6)
ERYTHROCYTE [DISTWIDTH] IN BLOOD BY AUTOMATED COUNT: 16.1 % (ref 11.5–14.5)
GFR SERPL CREATININE-BSD FRML MDRD: 46 ML/MIN
GLUCOSE SERPL-MCNC: 122 MG/DL (ref 70–104)
HCT VFR BLD AUTO: 40.9 % (ref 42–52)
HGB BLD-MCNC: 13.7 G/DL (ref 14–17.9)
LDH SERPL-CCNC: 831 U/L (ref 85–227)
LYMPHOCYTES # BLD AUTO: 0.7 X10'3 (ref 1.1–4.8)
LYMPHOCYTES NFR BLD AUTO: 14 % (ref 21–51)
MAGNESIUM SERPL-MCNC: 2.4 MG/DL (ref 1.5–2.4)
MCH RBC QN AUTO: 31.9 PG (ref 27–31)
MCHC RBC AUTO-ENTMCNC: 33.6 G/DL (ref 33–36.5)
MCV RBC AUTO: 95.1 FL (ref 78–98)
MONOCYTES # BLD AUTO: 0.7 X10'3 (ref 0–0.9)
MONOCYTES NFR BLD AUTO: 13.3 % (ref 2–12)
NEUTROPHILS # BLD AUTO: 3.9 X10'3 (ref 1.8–7.7)
NEUTROPHILS NFR BLD AUTO: 72.3 % (ref 42–75)
PLATELET # BLD AUTO: 230 X10'3 (ref 140–440)
PMV BLD AUTO: 7.6 FL (ref 7.4–10.4)
POTASSIUM SERPL-SCNC: 4 MMOL/L (ref 3.5–5.1)
PROT SERPL-MCNC: 7.9 G/DL (ref 6.4–8.2)
RBC # BLD AUTO: 4.3 X10'6 (ref 4.7–6.1)
SODIUM SERPL-SCNC: 136 MMOL/L (ref 135–145)
WBC # BLD AUTO: 5.3 X10'3 (ref 4.5–11)

## 2021-09-03 PROCEDURE — 83615 LACTATE (LD) (LDH) ENZYME: CPT

## 2021-09-03 PROCEDURE — 83605 ASSAY OF LACTIC ACID: CPT

## 2021-09-03 PROCEDURE — 86140 C-REACTIVE PROTEIN: CPT

## 2021-09-03 PROCEDURE — 87040 BLOOD CULTURE FOR BACTERIA: CPT

## 2021-09-03 PROCEDURE — 80048 BASIC METABOLIC PNL TOTAL CA: CPT

## 2021-09-03 PROCEDURE — 96375 TX/PRO/DX INJ NEW DRUG ADDON: CPT

## 2021-09-03 PROCEDURE — 85007 BL SMEAR W/DIFF WBC COUNT: CPT

## 2021-09-03 PROCEDURE — 36415 COLL VENOUS BLD VENIPUNCTURE: CPT

## 2021-09-03 PROCEDURE — 71045 X-RAY EXAM CHEST 1 VIEW: CPT

## 2021-09-03 PROCEDURE — 81003 URINALYSIS AUTO W/O SCOPE: CPT

## 2021-09-03 PROCEDURE — 85379 FIBRIN DEGRADATION QUANT: CPT

## 2021-09-03 PROCEDURE — 87081 CULTURE SCREEN ONLY: CPT

## 2021-09-03 PROCEDURE — 80053 COMPREHEN METABOLIC PANEL: CPT

## 2021-09-03 PROCEDURE — 96374 THER/PROPH/DIAG INJ IV PUSH: CPT

## 2021-09-03 PROCEDURE — 94760 N-INVAS EAR/PLS OXIMETRY 1: CPT

## 2021-09-03 PROCEDURE — 99285 EMERGENCY DEPT VISIT HI MDM: CPT

## 2021-09-03 PROCEDURE — 93005 ELECTROCARDIOGRAM TRACING: CPT

## 2021-09-03 PROCEDURE — 83735 ASSAY OF MAGNESIUM: CPT

## 2021-09-03 PROCEDURE — XW033E5 INTRODUCTION OF REMDESIVIR ANTI-INFECTIVE INTO PERIPHERAL VEIN, PERCUTANEOUS APPROACH, NEW TECHNOLOGY GROUP 5: ICD-10-PCS | Performed by: INTERNAL MEDICINE

## 2021-09-03 PROCEDURE — 84145 PROCALCITONIN (PCT): CPT

## 2021-09-03 PROCEDURE — 85025 COMPLETE CBC W/AUTO DIFF WBC: CPT

## 2021-09-03 PROCEDURE — 87635 SARS-COV-2 COVID-19 AMP PRB: CPT

## 2021-09-03 PROCEDURE — 83036 HEMOGLOBIN GLYCOSYLATED A1C: CPT

## 2021-09-03 PROCEDURE — 82948 REAGENT STRIP/BLOOD GLUCOSE: CPT

## 2021-09-03 RX ADMIN — ENOXAPARIN SODIUM SCH MG: 100 INJECTION SUBCUTANEOUS at 20:58

## 2021-09-03 RX ADMIN — SODIUM CHLORIDE SCH MLS/HR: 9 INJECTION INTRAMUSCULAR; INTRAVENOUS; SUBCUTANEOUS at 20:58

## 2021-09-03 NOTE — NUR
Patient in room ED 6. I have received report from ARDEN WU in ER and had the opportunity to 
ask questions and will assume patient care. Patient not yet on the unit.

## 2021-09-04 VITALS — DIASTOLIC BLOOD PRESSURE: 61 MMHG | SYSTOLIC BLOOD PRESSURE: 123 MMHG

## 2021-09-04 VITALS — DIASTOLIC BLOOD PRESSURE: 67 MMHG | SYSTOLIC BLOOD PRESSURE: 125 MMHG

## 2021-09-04 VITALS — DIASTOLIC BLOOD PRESSURE: 23 MMHG | SYSTOLIC BLOOD PRESSURE: 124 MMHG

## 2021-09-04 VITALS — DIASTOLIC BLOOD PRESSURE: 82 MMHG | SYSTOLIC BLOOD PRESSURE: 131 MMHG

## 2021-09-04 VITALS — SYSTOLIC BLOOD PRESSURE: 130 MMHG | DIASTOLIC BLOOD PRESSURE: 73 MMHG

## 2021-09-04 LAB
ALBUMIN SERPL BCP-MCNC: 2.4 G/DL (ref 3.4–5)
ANION GAP SERPL CALCULATED.3IONS-SCNC: 10 MMOL/L (ref 8–16)
BASOPHILS # BLD AUTO: 0 X10'3 (ref 0–0.2)
BASOPHILS NFR BLD AUTO: 0.3 % (ref 0–1)
BUN SERPL-MCNC: 23 MG/DL (ref 7–18)
BUN/CREAT SERPL: 24.2 (ref 5.4–32)
CALCIUM SERPL-MCNC: 7.8 MG/DL (ref 8.5–10.1)
CHLORIDE SERPL-SCNC: 105 MMOL/L (ref 99–107)
CO2 SERPL-SCNC: 23.7 MMOL/L (ref 24–32)
CREAT SERPL-MCNC: 0.95 MG/DL (ref 0.6–1.1)
EOSINOPHIL # BLD AUTO: 0 X10'3 (ref 0–0.9)
EOSINOPHIL NFR BLD AUTO: 0 % (ref 0–6)
ERYTHROCYTE [DISTWIDTH] IN BLOOD BY AUTOMATED COUNT: 15.7 % (ref 11.5–14.5)
GFR SERPL CREATININE-BSD FRML MDRD: 79 ML/MIN
GLUCOSE SERPL-MCNC: 162 MG/DL (ref 70–104)
HBA1C MFR BLD: 6.9 % (ref 4.5–6.2)
HCT VFR BLD AUTO: 37.6 % (ref 42–52)
HGB BLD-MCNC: 12.7 G/DL (ref 14–17.9)
LYMPHOCYTES # BLD AUTO: 0.5 X10'3 (ref 1.1–4.8)
LYMPHOCYTES NFR BLD AUTO: 11.8 % (ref 21–51)
MCH RBC QN AUTO: 32.1 PG (ref 27–31)
MCHC RBC AUTO-ENTMCNC: 33.7 G/DL (ref 33–36.5)
MCV RBC AUTO: 95.1 FL (ref 78–98)
MONOCYTES # BLD AUTO: 0.4 X10'3 (ref 0–0.9)
MONOCYTES NFR BLD AUTO: 10 % (ref 2–12)
NEUTROPHILS # BLD AUTO: 3.1 X10'3 (ref 1.8–7.7)
NEUTROPHILS NFR BLD AUTO: 77.9 % (ref 42–75)
PLATELET # BLD AUTO: 231 X10'3 (ref 140–440)
PMV BLD AUTO: 7.9 FL (ref 7.4–10.4)
POTASSIUM SERPL-SCNC: 3.8 MMOL/L (ref 3.5–5.1)
RBC # BLD AUTO: 3.95 X10'6 (ref 4.7–6.1)
SODIUM SERPL-SCNC: 139 MMOL/L (ref 135–145)
WBC # BLD AUTO: 3.9 X10'3 (ref 4.5–11)

## 2021-09-04 RX ADMIN — METHYLPREDNISOLONE SODIUM SUCCINATE SCH MG: 40 INJECTION, POWDER, FOR SOLUTION INTRAMUSCULAR; INTRAVENOUS at 16:02

## 2021-09-04 RX ADMIN — Medication SCH UNIT: at 21:00

## 2021-09-04 RX ADMIN — ENOXAPARIN SODIUM SCH MG: 100 INJECTION SUBCUTANEOUS at 09:48

## 2021-09-04 RX ADMIN — METHYLPREDNISOLONE SODIUM SUCCINATE SCH MG: 40 INJECTION, POWDER, FOR SOLUTION INTRAMUSCULAR; INTRAVENOUS at 00:43

## 2021-09-04 RX ADMIN — SODIUM CHLORIDE SCH MLS/HR: 9 INJECTION INTRAMUSCULAR; INTRAVENOUS; SUBCUTANEOUS at 16:03

## 2021-09-04 RX ADMIN — METOPROLOL SUCCINATE SCH MG: 25 TABLET, EXTENDED RELEASE ORAL at 20:11

## 2021-09-04 RX ADMIN — Medication SCH TAB: at 20:10

## 2021-09-04 RX ADMIN — METHYLPREDNISOLONE SODIUM SUCCINATE SCH MG: 40 INJECTION, POWDER, FOR SOLUTION INTRAMUSCULAR; INTRAVENOUS at 09:47

## 2021-09-04 RX ADMIN — Medication SCH MLS/HR: at 11:22

## 2021-09-04 RX ADMIN — SODIUM CHLORIDE SCH MLS/HR: 9 INJECTION INTRAMUSCULAR; INTRAVENOUS; SUBCUTANEOUS at 05:25

## 2021-09-04 RX ADMIN — BUDESONIDE SCH MG: 0.5 INHALANT RESPIRATORY (INHALATION) at 20:00

## 2021-09-04 NOTE — NUR
Problems reprioritized. Patient report given, questions answered & plan of care reviewed 
with STEPHON WU.

## 2021-09-04 NOTE — NUR
Problems reprioritized. Patient report given, questions answered & plan of care reviewed 
with JAZMIN Cuello.

## 2021-09-05 VITALS — DIASTOLIC BLOOD PRESSURE: 79 MMHG | SYSTOLIC BLOOD PRESSURE: 137 MMHG

## 2021-09-05 VITALS — DIASTOLIC BLOOD PRESSURE: 76 MMHG | SYSTOLIC BLOOD PRESSURE: 129 MMHG

## 2021-09-05 VITALS — SYSTOLIC BLOOD PRESSURE: 124 MMHG | DIASTOLIC BLOOD PRESSURE: 70 MMHG

## 2021-09-05 LAB
ALBUMIN SERPL BCP-MCNC: 2.5 G/DL (ref 3.4–5)
ANION GAP SERPL CALCULATED.3IONS-SCNC: 15 MMOL/L (ref 8–16)
BASOPHILS # BLD AUTO: 0 X10'3 (ref 0–0.2)
BASOPHILS NFR BLD AUTO: 0.1 % (ref 0–1)
BUN SERPL-MCNC: 21 MG/DL (ref 7–18)
BUN/CREAT SERPL: 19.1 (ref 5.4–32)
CALCIUM SERPL-MCNC: 7.5 MG/DL (ref 8.5–10.1)
CHLORIDE SERPL-SCNC: 104 MMOL/L (ref 99–107)
CO2 SERPL-SCNC: 20.4 MMOL/L (ref 24–32)
CREAT SERPL-MCNC: 1.1 MG/DL (ref 0.6–1.1)
EOSINOPHIL # BLD AUTO: 0 X10'3 (ref 0–0.9)
EOSINOPHIL NFR BLD AUTO: 0 % (ref 0–6)
ERYTHROCYTE [DISTWIDTH] IN BLOOD BY AUTOMATED COUNT: 16 % (ref 11.5–14.5)
GFR SERPL CREATININE-BSD FRML MDRD: 66 ML/MIN
GLUCOSE SERPL-MCNC: 166 MG/DL (ref 70–104)
HCT VFR BLD AUTO: 38.5 % (ref 42–52)
HGB BLD-MCNC: 12.9 G/DL (ref 14–17.9)
LYMPHOCYTES # BLD AUTO: 0.7 X10'3 (ref 1.1–4.8)
LYMPHOCYTES NFR BLD AUTO: 6.7 % (ref 21–51)
MCH RBC QN AUTO: 32 PG (ref 27–31)
MCHC RBC AUTO-ENTMCNC: 33.5 G/DL (ref 33–36.5)
MCV RBC AUTO: 95.6 FL (ref 78–98)
MONOCYTES # BLD AUTO: 0.8 X10'3 (ref 0–0.9)
MONOCYTES NFR BLD AUTO: 8.3 % (ref 2–12)
NEUTROPHILS # BLD AUTO: 8.5 X10'3 (ref 1.8–7.7)
NEUTROPHILS NFR BLD AUTO: 84.9 % (ref 42–75)
PLATELET # BLD AUTO: 340 X10'3 (ref 140–440)
PMV BLD AUTO: 7.9 FL (ref 7.4–10.4)
POTASSIUM SERPL-SCNC: 3.2 MMOL/L (ref 3.5–5.1)
RBC # BLD AUTO: 4.02 X10'6 (ref 4.7–6.1)
SODIUM SERPL-SCNC: 139 MMOL/L (ref 135–145)
WBC # BLD AUTO: 10 X10'3 (ref 4.5–11)

## 2021-09-05 PROCEDURE — 5A09357 ASSISTANCE WITH RESPIRATORY VENTILATION, LESS THAN 24 CONSECUTIVE HOURS, CONTINUOUS POSITIVE AIRWAY PRESSURE: ICD-10-PCS | Performed by: INTERNAL MEDICINE

## 2021-09-05 RX ADMIN — SODIUM CHLORIDE SCH MLS/HR: 9 INJECTION INTRAMUSCULAR; INTRAVENOUS; SUBCUTANEOUS at 00:46

## 2021-09-05 RX ADMIN — SODIUM CHLORIDE SCH MLS/HR: 9 INJECTION INTRAMUSCULAR; INTRAVENOUS; SUBCUTANEOUS at 13:27

## 2021-09-05 RX ADMIN — BUDESONIDE SCH MG: 0.5 INHALANT RESPIRATORY (INHALATION) at 20:00

## 2021-09-05 RX ADMIN — METOPROLOL SUCCINATE SCH MG: 25 TABLET, EXTENDED RELEASE ORAL at 21:05

## 2021-09-05 RX ADMIN — PANTOPRAZOLE SODIUM SCH MG: 40 TABLET, DELAYED RELEASE ORAL at 08:42

## 2021-09-05 RX ADMIN — Medication SCH MCG: at 08:50

## 2021-09-05 RX ADMIN — METHYLPREDNISOLONE SODIUM SUCCINATE SCH MG: 40 INJECTION, POWDER, FOR SOLUTION INTRAMUSCULAR; INTRAVENOUS at 08:37

## 2021-09-05 RX ADMIN — SODIUM CHLORIDE SCH MLS/HR: 9 INJECTION INTRAMUSCULAR; INTRAVENOUS; SUBCUTANEOUS at 22:00

## 2021-09-05 RX ADMIN — Medication SCH UNIT: at 21:00

## 2021-09-05 RX ADMIN — BUDESONIDE SCH MG: 0.5 INHALANT RESPIRATORY (INHALATION) at 09:50

## 2021-09-05 RX ADMIN — Medication SCH TAB: at 21:05

## 2021-09-05 RX ADMIN — Medication SCH UNIT: at 08:42

## 2021-09-05 RX ADMIN — ENOXAPARIN SODIUM SCH MG: 100 INJECTION SUBCUTANEOUS at 08:43

## 2021-09-05 RX ADMIN — METHYLPREDNISOLONE SODIUM SUCCINATE SCH MG: 40 INJECTION, POWDER, FOR SOLUTION INTRAMUSCULAR; INTRAVENOUS at 16:42

## 2021-09-05 RX ADMIN — POTASSIUM CHLORIDE PRN MEQ: 1500 TABLET, EXTENDED RELEASE ORAL at 18:13

## 2021-09-05 RX ADMIN — METHYLPREDNISOLONE SODIUM SUCCINATE SCH MG: 40 INJECTION, POWDER, FOR SOLUTION INTRAMUSCULAR; INTRAVENOUS at 23:57

## 2021-09-05 RX ADMIN — Medication SCH MLS/HR: at 08:36

## 2021-09-05 RX ADMIN — METHYLPREDNISOLONE SODIUM SUCCINATE SCH MG: 40 INJECTION, POWDER, FOR SOLUTION INTRAMUSCULAR; INTRAVENOUS at 00:43

## 2021-09-05 NOTE — NUR
Problems reprioritized. Patient report given, questions answered & plan of care reviewed 
with LACHELLE

## 2021-09-05 NOTE — NUR
Patient in room COVID 04. I have received report from mecca renee   and had the opportunity to 
ask questions and assume patient care.

## 2021-09-05 NOTE — NUR
Patient in room COVID 04. I have received report from ZEINAB   and had the opportunity to ask 
questions and assume patient care.

## 2021-09-05 NOTE — NUR
Patient found to be sitting on toilet had unhooked IV and oxygen, had a large amount of 
diarrhoea.stated he woke up and felt tied down and had to get to bathroom. Patient helped to 
be cleaned up and back to bed. appeared to be alert and orientated following this. . 
Daughter called to ask for patient to have cpap at night and that family would bring it in. 
Also requested ABG done. call made to Dr Behl who Ok ABG and cpap. Rt paged evaluated 
patient and deemed ABG not necessary. patient continues on 6L o2 sats 93% does not appear to 
be in distress. report given to mecca WU

## 2021-09-06 VITALS — SYSTOLIC BLOOD PRESSURE: 143 MMHG | DIASTOLIC BLOOD PRESSURE: 67 MMHG

## 2021-09-06 VITALS — DIASTOLIC BLOOD PRESSURE: 55 MMHG | SYSTOLIC BLOOD PRESSURE: 137 MMHG

## 2021-09-06 VITALS — DIASTOLIC BLOOD PRESSURE: 73 MMHG | SYSTOLIC BLOOD PRESSURE: 142 MMHG

## 2021-09-06 VITALS — SYSTOLIC BLOOD PRESSURE: 168 MMHG | DIASTOLIC BLOOD PRESSURE: 85 MMHG

## 2021-09-06 VITALS — DIASTOLIC BLOOD PRESSURE: 82 MMHG | SYSTOLIC BLOOD PRESSURE: 136 MMHG

## 2021-09-06 LAB
ALBUMIN SERPL BCP-MCNC: 2.5 G/DL (ref 3.4–5)
ANION GAP SERPL CALCULATED.3IONS-SCNC: 11 MMOL/L (ref 8–16)
ANISOCYTOSIS BLD QL SMEAR: (no result)
BASOPHILS # BLD AUTO: 0 X10'3 (ref 0–0.2)
BASOPHILS NFR BLD AUTO: 0.4 % (ref 0–1)
BUN SERPL-MCNC: 21 MG/DL (ref 7–18)
BUN/CREAT SERPL: 20.6 (ref 5.4–32)
BURR CELLS BLD QL SMEAR: (no result)
CALCIUM SERPL-MCNC: 7.4 MG/DL (ref 8.5–10.1)
CHLORIDE SERPL-SCNC: 105 MMOL/L (ref 99–107)
CO2 SERPL-SCNC: 24.9 MMOL/L (ref 24–32)
CREAT SERPL-MCNC: 1.02 MG/DL (ref 0.6–1.1)
D DIMER PPP FEU-MCNC: 0.78 MG/L FEU (ref 0–0.5)
EOSINOPHIL # BLD AUTO: 0 X10'3 (ref 0–0.9)
EOSINOPHIL NFR BLD AUTO: 0 % (ref 0–6)
ERYTHROCYTE [DISTWIDTH] IN BLOOD BY AUTOMATED COUNT: 15.9 % (ref 11.5–14.5)
GFR SERPL CREATININE-BSD FRML MDRD: 72 ML/MIN
GLUCOSE SERPL-MCNC: 147 MG/DL (ref 70–104)
HCT VFR BLD AUTO: 37.5 % (ref 42–52)
HGB BLD-MCNC: 12.6 G/DL (ref 14–17.9)
LYMPHOCYTES # BLD AUTO: 0.5 X10'3 (ref 1.1–4.8)
LYMPHOCYTES NFR BLD AUTO: 5.6 % (ref 21–51)
LYMPHOCYTES NFR BLD MANUAL: 6 % (ref 21–51)
MCH RBC QN AUTO: 31.9 PG (ref 27–31)
MCHC RBC AUTO-ENTMCNC: 33.7 G/DL (ref 33–36.5)
MCV RBC AUTO: 94.8 FL (ref 78–98)
MONOCYTES # BLD AUTO: 1.1 X10'3 (ref 0–0.9)
MONOCYTES NFR BLD AUTO: 11.5 % (ref 2–12)
MONOCYTES NFR BLD MANUAL: 11 % (ref 2–12)
NEUTROPHILS # BLD AUTO: 7.9 X10'3 (ref 1.8–7.7)
NEUTROPHILS NFR BLD AUTO: 82.5 % (ref 42–75)
NEUTS BAND # BLD MANUAL: 6 % (ref 0–10)
NEUTS SEG NFR BLD MANUAL: 76 % (ref 42–75)
PLATELET # BLD AUTO: 360 X10'3 (ref 140–440)
PLATELET BLD QL SMEAR: NORMAL
PMV BLD AUTO: 7.6 FL (ref 7.4–10.4)
POTASSIUM SERPL-SCNC: 3.4 MMOL/L (ref 3.5–5.1)
RBC # BLD AUTO: 3.95 X10'6 (ref 4.7–6.1)
RBC MORPH BLD: (no result)
SODIUM SERPL-SCNC: 141 MMOL/L (ref 135–145)
TOTAL CELLS COUNTED FLD: 100
TOXIC GRANULES BLD QL SMEAR: (no result)
VARIANT LYMPHS NFR BLD MANUAL: 1 % (ref 0–0)
WBC # BLD AUTO: 9.6 X10'3 (ref 4.5–11)

## 2021-09-06 PROCEDURE — 5A0945A ASSISTANCE WITH RESPIRATORY VENTILATION, 24-96 CONSECUTIVE HOURS, HIGH NASAL FLOW/VELOCITY: ICD-10-PCS | Performed by: INTERNAL MEDICINE

## 2021-09-06 RX ADMIN — Medication SCH TAB: at 20:09

## 2021-09-06 RX ADMIN — Medication SCH MLS/HR: at 08:05

## 2021-09-06 RX ADMIN — ENOXAPARIN SODIUM SCH MG: 100 INJECTION SUBCUTANEOUS at 08:12

## 2021-09-06 RX ADMIN — BUDESONIDE SCH MG: 0.5 INHALANT RESPIRATORY (INHALATION) at 20:00

## 2021-09-06 RX ADMIN — PANTOPRAZOLE SODIUM SCH MG: 40 TABLET, DELAYED RELEASE ORAL at 08:11

## 2021-09-06 RX ADMIN — METHYLPREDNISOLONE SODIUM SUCCINATE SCH MG: 40 INJECTION, POWDER, FOR SOLUTION INTRAMUSCULAR; INTRAVENOUS at 08:12

## 2021-09-06 RX ADMIN — BUDESONIDE SCH MG: 0.5 INHALANT RESPIRATORY (INHALATION) at 08:00

## 2021-09-06 RX ADMIN — Medication SCH MCG: at 08:11

## 2021-09-06 RX ADMIN — METOPROLOL SUCCINATE SCH MG: 25 TABLET, EXTENDED RELEASE ORAL at 20:09

## 2021-09-06 RX ADMIN — METHYLPREDNISOLONE SODIUM SUCCINATE SCH MG: 40 INJECTION, POWDER, FOR SOLUTION INTRAMUSCULAR; INTRAVENOUS at 23:41

## 2021-09-06 RX ADMIN — METHYLPREDNISOLONE SODIUM SUCCINATE SCH MG: 40 INJECTION, POWDER, FOR SOLUTION INTRAMUSCULAR; INTRAVENOUS at 16:29

## 2021-09-06 RX ADMIN — POTASSIUM CHLORIDE PRN MEQ: 1500 TABLET, EXTENDED RELEASE ORAL at 17:08

## 2021-09-06 RX ADMIN — Medication SCH UNIT: at 08:11

## 2021-09-06 RX ADMIN — SODIUM CHLORIDE SCH MLS/HR: 9 INJECTION INTRAMUSCULAR; INTRAVENOUS; SUBCUTANEOUS at 08:13

## 2021-09-06 RX ADMIN — POTASSIUM CHLORIDE PRN MEQ: 1500 TABLET, EXTENDED RELEASE ORAL at 21:02

## 2021-09-06 RX ADMIN — ENOXAPARIN SODIUM SCH MG: 100 INJECTION SUBCUTANEOUS at 20:10

## 2021-09-06 RX ADMIN — Medication SCH UNIT: at 21:00

## 2021-09-06 NOTE — NUR
Patient in room COVID 04. I have received report from GELY WU   and had the opportunity to 
ask questions and assume patient care.

## 2021-09-06 NOTE — NUR
patient continues on high flow salter o2 sats 88-94%. patient can be impulsive and detach 
himself from Iv and O2 to go to bathroom patient advised that this isnt safe to do this but 
can be resistant to this advise. Patient agreed to use call light next time. seen by DR Behl 
all cares continue. Taught on the use of an IS. will continue to monitor.

## 2021-09-07 VITALS — DIASTOLIC BLOOD PRESSURE: 69 MMHG | SYSTOLIC BLOOD PRESSURE: 114 MMHG

## 2021-09-07 VITALS — DIASTOLIC BLOOD PRESSURE: 61 MMHG | SYSTOLIC BLOOD PRESSURE: 107 MMHG

## 2021-09-07 VITALS — DIASTOLIC BLOOD PRESSURE: 71 MMHG | SYSTOLIC BLOOD PRESSURE: 135 MMHG

## 2021-09-07 VITALS — DIASTOLIC BLOOD PRESSURE: 72 MMHG | SYSTOLIC BLOOD PRESSURE: 134 MMHG

## 2021-09-07 VITALS — SYSTOLIC BLOOD PRESSURE: 134 MMHG | DIASTOLIC BLOOD PRESSURE: 72 MMHG

## 2021-09-07 VITALS — DIASTOLIC BLOOD PRESSURE: 81 MMHG | SYSTOLIC BLOOD PRESSURE: 138 MMHG

## 2021-09-07 LAB
ALBUMIN SERPL BCP-MCNC: 2.3 G/DL (ref 3.4–5)
ANION GAP SERPL CALCULATED.3IONS-SCNC: 9 MMOL/L (ref 8–16)
ANISOCYTOSIS BLD QL SMEAR: (no result)
BASOPHILS # BLD AUTO: 0.1 X10'3 (ref 0–0.2)
BASOPHILS NFR BLD AUTO: 0.6 % (ref 0–1)
BUN SERPL-MCNC: 27 MG/DL (ref 7–18)
BUN/CREAT SERPL: 20.6 (ref 5.4–32)
BURR CELLS BLD QL SMEAR: (no result)
CALCIUM SERPL-MCNC: 7.7 MG/DL (ref 8.5–10.1)
CHLORIDE SERPL-SCNC: 104 MMOL/L (ref 99–107)
CO2 SERPL-SCNC: 26.6 MMOL/L (ref 24–32)
CREAT SERPL-MCNC: 1.31 MG/DL (ref 0.6–1.1)
CRP SERPL HS-MCNC: 1.07 MG/DL (ref 0–0.5)
D DIMER PPP FEU-MCNC: 0.78 MG/L FEU (ref 0–0.5)
EOSINOPHIL # BLD AUTO: 0 X10'3 (ref 0–0.9)
EOSINOPHIL NFR BLD AUTO: 0.1 % (ref 0–6)
ERYTHROCYTE [DISTWIDTH] IN BLOOD BY AUTOMATED COUNT: 16 % (ref 11.5–14.5)
GFR SERPL CREATININE-BSD FRML MDRD: 54 ML/MIN
GLUCOSE SERPL-MCNC: 247 MG/DL (ref 70–104)
HCT VFR BLD AUTO: 37.3 % (ref 42–52)
HGB BLD-MCNC: 12.4 G/DL (ref 14–17.9)
LYMPHOCYTES # BLD AUTO: 0.8 X10'3 (ref 1.1–4.8)
LYMPHOCYTES NFR BLD AUTO: 7.8 % (ref 21–51)
LYMPHOCYTES NFR BLD MANUAL: 6 % (ref 21–51)
MCH RBC QN AUTO: 32.3 PG (ref 27–31)
MCHC RBC AUTO-ENTMCNC: 33.3 G/DL (ref 33–36.5)
MCV RBC AUTO: 97 FL (ref 78–98)
METAMYELOCYTES NFR BLD MANUAL: 2 % (ref 0–0)
MONOCYTES # BLD AUTO: 1 X10'3 (ref 0–0.9)
MONOCYTES NFR BLD AUTO: 9.5 % (ref 2–12)
MONOCYTES NFR BLD MANUAL: 7 % (ref 2–12)
NEUTROPHILS # BLD AUTO: 8.7 X10'3 (ref 1.8–7.7)
NEUTROPHILS NFR BLD AUTO: 82 % (ref 42–75)
NEUTS BAND # BLD MANUAL: 2 % (ref 0–10)
NEUTS SEG NFR BLD MANUAL: 83 % (ref 42–75)
PLATELET # BLD AUTO: 359 X10'3 (ref 140–440)
PLATELET BLD QL SMEAR: NORMAL
PMV BLD AUTO: 7.8 FL (ref 7.4–10.4)
POTASSIUM SERPL-SCNC: 3.9 MMOL/L (ref 3.5–5.1)
RBC # BLD AUTO: 3.84 X10'6 (ref 4.7–6.1)
RBC MORPH BLD: (no result)
SODIUM SERPL-SCNC: 140 MMOL/L (ref 135–145)
TOTAL CELLS COUNTED FLD: 100
WBC # BLD AUTO: 10.6 X10'3 (ref 4.5–11)

## 2021-09-07 RX ADMIN — Medication SCH MCG: at 08:50

## 2021-09-07 RX ADMIN — PANTOPRAZOLE SODIUM SCH MG: 40 TABLET, DELAYED RELEASE ORAL at 08:50

## 2021-09-07 RX ADMIN — Medication SCH UNIT: at 21:00

## 2021-09-07 RX ADMIN — Medication SCH MLS/HR: at 08:50

## 2021-09-07 RX ADMIN — ENOXAPARIN SODIUM SCH MG: 100 INJECTION SUBCUTANEOUS at 08:53

## 2021-09-07 RX ADMIN — BUDESONIDE SCH MG: 0.5 INHALANT RESPIRATORY (INHALATION) at 20:00

## 2021-09-07 RX ADMIN — METOPROLOL SUCCINATE SCH MG: 25 TABLET, EXTENDED RELEASE ORAL at 22:10

## 2021-09-07 RX ADMIN — Medication SCH UNIT: at 08:50

## 2021-09-07 RX ADMIN — BUDESONIDE SCH MG: 0.5 INHALANT RESPIRATORY (INHALATION) at 08:00

## 2021-09-07 RX ADMIN — ENOXAPARIN SODIUM SCH MG: 100 INJECTION SUBCUTANEOUS at 22:14

## 2021-09-07 RX ADMIN — Medication SCH TAB: at 22:12

## 2021-09-07 RX ADMIN — METHYLPREDNISOLONE SODIUM SUCCINATE SCH MG: 40 INJECTION, POWDER, FOR SOLUTION INTRAMUSCULAR; INTRAVENOUS at 16:44

## 2021-09-07 RX ADMIN — METHYLPREDNISOLONE SODIUM SUCCINATE SCH MG: 40 INJECTION, POWDER, FOR SOLUTION INTRAMUSCULAR; INTRAVENOUS at 08:50

## 2021-09-07 NOTE — NUR
Initial: Pt admitted w/ flu-like symptoms and Covid dx per EMR. Pt has 

been eating well, 100% of meals on Regular diet meeting needs. Discussed 

w/ RN about changing to CCHO diet if MD agreeable given pt's DM hx and A1C 

of 6.9 and elevated glucose throughout admission. No N/V/D noted, LBM 9/6. 

Will continue to monitor.

Recs:

1. Add CCHO diet if MD agreeable

2. Bowel care per rx

3. Scaled wt this admit, weekly wt thereafter

-------------------------------------------------------------------------------

Addendum: 09/07/21 at 1210 by Barak Workman RD

-------------------------------------------------------------------------------

Amended: Links added.

## 2021-09-07 NOTE — NUR
Problems reprioritized. Patient report given, questions answered & plan of care reviewed 
with Mikayla WU.

## 2021-09-07 NOTE — NUR
Problems reprioritized. Patient report given, questions answered & plan of care reviewed 
with JAZMIN Kam.

## 2021-09-07 NOTE — NUR
Patient in room COVID 04. I have received report from JAZMIN RANDOLPH and had the opportunity to 
ask questions and assume patient care.

## 2021-09-08 VITALS — SYSTOLIC BLOOD PRESSURE: 113 MMHG | DIASTOLIC BLOOD PRESSURE: 58 MMHG

## 2021-09-08 VITALS — DIASTOLIC BLOOD PRESSURE: 69 MMHG | SYSTOLIC BLOOD PRESSURE: 138 MMHG

## 2021-09-08 VITALS — DIASTOLIC BLOOD PRESSURE: 60 MMHG | SYSTOLIC BLOOD PRESSURE: 116 MMHG

## 2021-09-08 VITALS — SYSTOLIC BLOOD PRESSURE: 98 MMHG | DIASTOLIC BLOOD PRESSURE: 55 MMHG

## 2021-09-08 VITALS — SYSTOLIC BLOOD PRESSURE: 178 MMHG | DIASTOLIC BLOOD PRESSURE: 7 MMHG

## 2021-09-08 VITALS — SYSTOLIC BLOOD PRESSURE: 126 MMHG | DIASTOLIC BLOOD PRESSURE: 70 MMHG

## 2021-09-08 LAB
ALBUMIN SERPL BCP-MCNC: 2.5 G/DL (ref 3.4–5)
ANION GAP SERPL CALCULATED.3IONS-SCNC: 7 MMOL/L (ref 8–16)
ANISOCYTOSIS BLD QL SMEAR: (no result)
BASOPHILS # BLD AUTO: 0 X10'3 (ref 0–0.2)
BASOPHILS NFR BLD AUTO: 0 % (ref 0–1)
BUN SERPL-MCNC: 30 MG/DL (ref 7–18)
BUN/CREAT SERPL: 28 (ref 5.4–32)
CALCIUM SERPL-MCNC: 7.8 MG/DL (ref 8.5–10.1)
CHLORIDE SERPL-SCNC: 105 MMOL/L (ref 99–107)
CO2 SERPL-SCNC: 27.7 MMOL/L (ref 24–32)
CREAT SERPL-MCNC: 1.07 MG/DL (ref 0.6–1.1)
CRP SERPL HS-MCNC: 0.69 MG/DL (ref 0–0.5)
D DIMER PPP FEU-MCNC: 0.68 MG/L FEU (ref 0–0.5)
EOSINOPHIL # BLD AUTO: 0 X10'3 (ref 0–0.9)
EOSINOPHIL NFR BLD AUTO: 0 % (ref 0–6)
EOSINOPHIL NFR BLD MANUAL: 1 % (ref 0–6)
ERYTHROCYTE [DISTWIDTH] IN BLOOD BY AUTOMATED COUNT: 16.3 % (ref 11.5–14.5)
GFR SERPL CREATININE-BSD FRML MDRD: 69 ML/MIN
GLUCOSE SERPL-MCNC: 170 MG/DL (ref 70–104)
HCT VFR BLD AUTO: 37.7 % (ref 42–52)
HGB BLD-MCNC: 12.7 G/DL (ref 14–17.9)
LYMPHOCYTES # BLD AUTO: 0.9 X10'3 (ref 1.1–4.8)
LYMPHOCYTES NFR BLD AUTO: 7.1 % (ref 21–51)
LYMPHOCYTES NFR BLD MANUAL: 5 % (ref 21–51)
MCH RBC QN AUTO: 31.9 PG (ref 27–31)
MCHC RBC AUTO-ENTMCNC: 33.6 G/DL (ref 33–36.5)
MCV RBC AUTO: 95 FL (ref 78–98)
METAMYELOCYTES NFR BLD MANUAL: 3 % (ref 0–0)
MONOCYTES # BLD AUTO: 1.3 X10'3 (ref 0–0.9)
MONOCYTES NFR BLD AUTO: 10.2 % (ref 2–12)
MONOCYTES NFR BLD MANUAL: 11 % (ref 2–12)
NEUTROPHILS # BLD AUTO: 10.9 X10'3 (ref 1.8–7.7)
NEUTROPHILS NFR BLD AUTO: 82.7 % (ref 42–75)
NEUTS BAND # BLD MANUAL: 2 % (ref 0–10)
NEUTS SEG NFR BLD MANUAL: 77 % (ref 42–75)
OVALOCYTES BLD QL SMEAR: (no result)
PLATELET # BLD AUTO: 383 X10'3 (ref 140–440)
PLATELET BLD QL SMEAR: NORMAL
PMV BLD AUTO: 7.4 FL (ref 7.4–10.4)
POTASSIUM SERPL-SCNC: 4.1 MMOL/L (ref 3.5–5.1)
RBC # BLD AUTO: 3.97 X10'6 (ref 4.7–6.1)
RBC MORPH BLD: (no result)
SODIUM SERPL-SCNC: 140 MMOL/L (ref 135–145)
TOTAL CELLS COUNTED FLD: 100
VARIANT LYMPHS NFR BLD MANUAL: 1 % (ref 0–0)
WBC # BLD AUTO: 13.2 X10'3 (ref 4.5–11)

## 2021-09-08 PROCEDURE — 5A09357 ASSISTANCE WITH RESPIRATORY VENTILATION, LESS THAN 24 CONSECUTIVE HOURS, CONTINUOUS POSITIVE AIRWAY PRESSURE: ICD-10-PCS | Performed by: INTERNAL MEDICINE

## 2021-09-08 RX ADMIN — METOPROLOL SUCCINATE SCH MG: 25 TABLET, EXTENDED RELEASE ORAL at 19:39

## 2021-09-08 RX ADMIN — ENOXAPARIN SODIUM SCH MG: 100 INJECTION SUBCUTANEOUS at 10:16

## 2021-09-08 RX ADMIN — Medication SCH UNIT: at 10:16

## 2021-09-08 RX ADMIN — Medication SCH MCG: at 10:17

## 2021-09-08 RX ADMIN — BUDESONIDE SCH PUFF: 180 AEROSOL, POWDER RESPIRATORY (INHALATION) at 19:39

## 2021-09-08 RX ADMIN — PANTOPRAZOLE SODIUM SCH MG: 40 TABLET, DELAYED RELEASE ORAL at 10:17

## 2021-09-08 RX ADMIN — METHYLPREDNISOLONE SODIUM SUCCINATE SCH MG: 40 INJECTION, POWDER, FOR SOLUTION INTRAMUSCULAR; INTRAVENOUS at 10:17

## 2021-09-08 RX ADMIN — Medication SCH TAB: at 19:40

## 2021-09-08 RX ADMIN — METHYLPREDNISOLONE SODIUM SUCCINATE SCH MG: 40 INJECTION, POWDER, FOR SOLUTION INTRAMUSCULAR; INTRAVENOUS at 19:38

## 2021-09-08 RX ADMIN — ENOXAPARIN SODIUM SCH MG: 100 INJECTION SUBCUTANEOUS at 19:40

## 2021-09-08 RX ADMIN — METHYLPREDNISOLONE SODIUM SUCCINATE SCH MG: 40 INJECTION, POWDER, FOR SOLUTION INTRAMUSCULAR; INTRAVENOUS at 01:03

## 2021-09-08 RX ADMIN — Medication SCH UNIT: at 21:00

## 2021-09-08 NOTE — NUR
will start on diabetic protocol with elevated glucose levels. Dr. Bond ordered protocol.  
pt using cpap with 5L when he sleeps.  will see if he will titrate down later today if sats 
continue to be stable on cpap

## 2021-09-08 NOTE — NUR
Page Sent



PAGER ID:  [phone] 

MESSAGE:  [id number] Regarding [id number] CLEMENTE Carballo- Please call regarding pt medication. 
Thanks!

## 2021-09-08 NOTE — NUR
Problems reprioritized. Patient report given,Connie & JAZMIN Levin questions answered & plan 
of care reviewed with .

## 2021-09-08 NOTE — NUR
Page Sent

 

PAGER ID:  [phone] 

MESSAGE:  [id number] SECOND PAGE Regarding [id number] CLEMENTE Carballo- medication route change 
needed. Please Call.

## 2021-09-09 VITALS — DIASTOLIC BLOOD PRESSURE: 67 MMHG | SYSTOLIC BLOOD PRESSURE: 122 MMHG

## 2021-09-09 VITALS — SYSTOLIC BLOOD PRESSURE: 114 MMHG | DIASTOLIC BLOOD PRESSURE: 66 MMHG

## 2021-09-09 VITALS — SYSTOLIC BLOOD PRESSURE: 116 MMHG | DIASTOLIC BLOOD PRESSURE: 69 MMHG

## 2021-09-09 VITALS — SYSTOLIC BLOOD PRESSURE: 119 MMHG | DIASTOLIC BLOOD PRESSURE: 74 MMHG

## 2021-09-09 VITALS — DIASTOLIC BLOOD PRESSURE: 53 MMHG | SYSTOLIC BLOOD PRESSURE: 106 MMHG

## 2021-09-09 LAB
CLARITY UR: CLEAR
COLOR UR: YELLOW
D DIMER PPP FEU-MCNC: 0.63 MG/L FEU (ref 0–0.5)
GLUCOSE UR STRIP-MCNC: NEGATIVE MG/DL
HGB UR QL STRIP: NEGATIVE
KETONES UR STRIP-MCNC: NEGATIVE MG/DL
LEUKOCYTE ESTERASE UR QL STRIP: NEGATIVE
NITRITE UR QL STRIP: NEGATIVE
PH UR STRIP: 5 [PH] (ref 4.8–8)
PROT UR QL STRIP: NEGATIVE MG/DL
SP GR UR STRIP: 1.01 (ref 1–1.03)
URN COLLECT METHOD CLASS: (no result)
UROBILINOGEN UR STRIP-MCNC: 0.2 E.U/DL (ref 0.2–1)

## 2021-09-09 PROCEDURE — 5A09357 ASSISTANCE WITH RESPIRATORY VENTILATION, LESS THAN 24 CONSECUTIVE HOURS, CONTINUOUS POSITIVE AIRWAY PRESSURE: ICD-10-PCS | Performed by: INTERNAL MEDICINE

## 2021-09-09 PROCEDURE — 5A0935A ASSISTANCE WITH RESPIRATORY VENTILATION, LESS THAN 24 CONSECUTIVE HOURS, HIGH NASAL FLOW/VELOCITY: ICD-10-PCS | Performed by: INTERNAL MEDICINE

## 2021-09-09 RX ADMIN — INSULIN LISPRO SCH UNITS: 100 INJECTION, SOLUTION INTRAVENOUS; SUBCUTANEOUS at 19:09

## 2021-09-09 RX ADMIN — Medication SCH MCG: at 08:57

## 2021-09-09 RX ADMIN — METHYLPREDNISOLONE SODIUM SUCCINATE SCH MG: 40 INJECTION, POWDER, FOR SOLUTION INTRAMUSCULAR; INTRAVENOUS at 09:00

## 2021-09-09 RX ADMIN — Medication SCH UNIT: at 08:56

## 2021-09-09 RX ADMIN — INSULIN LISPRO SCH UNITS: 100 INJECTION, SOLUTION INTRAVENOUS; SUBCUTANEOUS at 14:10

## 2021-09-09 RX ADMIN — METHYLPREDNISOLONE SODIUM SUCCINATE SCH MG: 40 INJECTION, POWDER, FOR SOLUTION INTRAMUSCULAR; INTRAVENOUS at 22:41

## 2021-09-09 RX ADMIN — INSULIN GLARGINE SCH UNIT: 100 INJECTION, SOLUTION SUBCUTANEOUS at 22:46

## 2021-09-09 RX ADMIN — ENOXAPARIN SODIUM SCH MG: 100 INJECTION SUBCUTANEOUS at 08:56

## 2021-09-09 RX ADMIN — INSULIN GLARGINE SCH UNIT: 100 INJECTION, SOLUTION SUBCUTANEOUS at 00:26

## 2021-09-09 RX ADMIN — BUDESONIDE SCH PUFF: 180 AEROSOL, POWDER RESPIRATORY (INHALATION) at 20:00

## 2021-09-09 RX ADMIN — BUDESONIDE SCH PUFF: 180 AEROSOL, POWDER RESPIRATORY (INHALATION) at 09:10

## 2021-09-09 RX ADMIN — METOPROLOL SUCCINATE SCH MG: 25 TABLET, EXTENDED RELEASE ORAL at 22:42

## 2021-09-09 RX ADMIN — PANTOPRAZOLE SODIUM SCH MG: 40 TABLET, DELAYED RELEASE ORAL at 09:00

## 2021-09-09 RX ADMIN — ENOXAPARIN SODIUM SCH MG: 100 INJECTION SUBCUTANEOUS at 22:42

## 2021-09-09 RX ADMIN — INSULIN LISPRO SCH UNITS: 100 INJECTION, SOLUTION INTRAVENOUS; SUBCUTANEOUS at 09:18

## 2021-09-09 RX ADMIN — Medication SCH UNIT: at 21:00

## 2021-09-09 RX ADMIN — Medication SCH TAB: at 22:41

## 2021-09-09 NOTE — NUR
DM consult: Pt with A1c 6.9%, DM education not warranted at this time. 

Will continue to follow.

-------------------------------------------------------------------------------

Addendum: 09/09/21 at 1459 by Stacey Mcnulty RD

-------------------------------------------------------------------------------

Amended: Links added.

## 2021-09-09 NOTE — NUR
Problems reprioritized. Patient report given, questions answered & plan of care reviewed 
with Merry WU.

## 2021-09-09 NOTE — NUR
Patient in room ORTHO 4024. I have received report from  Praveena WU and had the 
opportunity to ask questions and assume patient care.

## 2021-09-09 NOTE — NUR
Pt has been stable and is able to make his needs known. He continues on 5L HF. He has 
repositioned himself throughout shift. Will continue to monitor until report given to NOC 
shift and NOC shift assumes care.

## 2021-09-09 NOTE — NUR
Patient in room ORTHO 4024. I have received report from Ludivina WU and had the opportunity to 
ask questions and assume patient care.

## 2021-09-09 NOTE — NUR
Problems reprioritized. Patient report given, questions answered & plan of care reviewed 
with JAZMIN Florence.

## 2021-09-09 NOTE — NUR
Problems reprioritized. Patient report given, questions answered & plan of care reviewed 
with JAZMIN Cortez..

## 2021-09-10 VITALS — SYSTOLIC BLOOD PRESSURE: 99 MMHG | DIASTOLIC BLOOD PRESSURE: 41 MMHG

## 2021-09-10 VITALS — DIASTOLIC BLOOD PRESSURE: 66 MMHG | SYSTOLIC BLOOD PRESSURE: 128 MMHG

## 2021-09-10 VITALS — SYSTOLIC BLOOD PRESSURE: 97 MMHG | DIASTOLIC BLOOD PRESSURE: 47 MMHG

## 2021-09-10 VITALS — DIASTOLIC BLOOD PRESSURE: 69 MMHG | SYSTOLIC BLOOD PRESSURE: 122 MMHG

## 2021-09-10 LAB — D DIMER PPP FEU-MCNC: 0.64 MG/L FEU (ref 0–0.5)

## 2021-09-10 PROCEDURE — 5A0935A ASSISTANCE WITH RESPIRATORY VENTILATION, LESS THAN 24 CONSECUTIVE HOURS, HIGH NASAL FLOW/VELOCITY: ICD-10-PCS | Performed by: INTERNAL MEDICINE

## 2021-09-10 RX ADMIN — Medication SCH MCG: at 08:18

## 2021-09-10 RX ADMIN — INSULIN LISPRO SCH UNITS: 100 INJECTION, SOLUTION INTRAVENOUS; SUBCUTANEOUS at 08:15

## 2021-09-10 RX ADMIN — BUDESONIDE SCH PUFF: 180 AEROSOL, POWDER RESPIRATORY (INHALATION) at 07:36

## 2021-09-10 RX ADMIN — PANTOPRAZOLE SODIUM SCH MG: 40 TABLET, DELAYED RELEASE ORAL at 08:17

## 2021-09-10 RX ADMIN — ENOXAPARIN SODIUM SCH MG: 100 INJECTION SUBCUTANEOUS at 08:19

## 2021-09-10 RX ADMIN — METHYLPREDNISOLONE SODIUM SUCCINATE SCH MG: 40 INJECTION, POWDER, FOR SOLUTION INTRAMUSCULAR; INTRAVENOUS at 08:19

## 2021-09-10 RX ADMIN — Medication SCH UNIT: at 08:17

## 2021-09-10 NOTE — NUR
pt discharged home with wife. Pt initially didnt qualify for home 02 but later dropped to 
87% on ra. home 02 ordered. [facility] for pt 
before picking up pt. iv taken out, heart monitor dc'd. all belongings taken from room. pt 
appropriate for discharge.

## 2021-09-10 NOTE — NUR
O2 Sat at rest on room air:_96%

     If below 89%:

Recovery O2 Sat at rest on ___LPM:___%:___% via______________(mask/nasal cannula, etc..)

No further documentation is necessary.



     If O2 Sat did not drop below 89% on room air,ambulate patient on room air.

O2 Sat while ambulating on room air:87%

Recovery O2 Sat while ambulating on  3 LPM:_94%

No further documentation is necessary.



If patient does not drop below 89% while ambulating, he/she does not qualify for home O2.